# Patient Record
Sex: FEMALE | Race: WHITE | Employment: PART TIME | URBAN - METROPOLITAN AREA
[De-identification: names, ages, dates, MRNs, and addresses within clinical notes are randomized per-mention and may not be internally consistent; named-entity substitution may affect disease eponyms.]

---

## 2017-03-08 ENCOUNTER — TELEPHONE (OUTPATIENT)
Dept: ENDOCRINOLOGY | Age: 27
End: 2017-03-08

## 2017-03-09 ENCOUNTER — TELEPHONE (OUTPATIENT)
Dept: ENDOCRINOLOGY | Age: 27
End: 2017-03-09

## 2017-03-28 ENCOUNTER — OFFICE VISIT (OUTPATIENT)
Dept: ENDOCRINOLOGY | Age: 27
End: 2017-03-28

## 2017-03-28 VITALS
HEIGHT: 63 IN | BODY MASS INDEX: 26.01 KG/M2 | RESPIRATION RATE: 16 BRPM | WEIGHT: 146.8 LBS | DIASTOLIC BLOOD PRESSURE: 72 MMHG | HEART RATE: 82 BPM | SYSTOLIC BLOOD PRESSURE: 118 MMHG

## 2017-03-28 DIAGNOSIS — E13.9 LADA (LATENT AUTOIMMUNE DIABETES IN ADULTS), MANAGED AS TYPE 1 (HCC): Primary | ICD-10-CM

## 2017-03-28 LAB — HBA1C MFR BLD: 8.2 %

## 2017-03-28 PROCEDURE — 83036 HEMOGLOBIN GLYCOSYLATED A1C: CPT | Performed by: INTERNAL MEDICINE

## 2017-03-28 PROCEDURE — 99214 OFFICE O/P EST MOD 30 MIN: CPT | Performed by: INTERNAL MEDICINE

## 2017-04-26 RX ORDER — INSULIN GLARGINE 100 [IU]/ML
INJECTION, SOLUTION SUBCUTANEOUS
Qty: 15 ML | Refills: 3 | Status: SHIPPED | OUTPATIENT
Start: 2017-04-26 | End: 2017-10-23 | Stop reason: SDUPTHER

## 2017-07-10 ENCOUNTER — TELEPHONE (OUTPATIENT)
Dept: ENDOCRINOLOGY | Age: 27
End: 2017-07-10

## 2017-08-29 ENCOUNTER — TELEPHONE (OUTPATIENT)
Dept: ENDOCRINOLOGY | Age: 27
End: 2017-08-29

## 2017-09-06 ENCOUNTER — OFFICE VISIT (OUTPATIENT)
Dept: ENDOCRINOLOGY | Age: 27
End: 2017-09-06

## 2017-09-06 VITALS
DIASTOLIC BLOOD PRESSURE: 84 MMHG | HEIGHT: 63 IN | SYSTOLIC BLOOD PRESSURE: 118 MMHG | WEIGHT: 137.2 LBS | BODY MASS INDEX: 24.31 KG/M2 | HEART RATE: 83 BPM | OXYGEN SATURATION: 100 % | RESPIRATION RATE: 16 BRPM

## 2017-09-06 DIAGNOSIS — E13.9 LADA (LATENT AUTOIMMUNE DIABETES IN ADULTS), MANAGED AS TYPE 1 (HCC): Primary | ICD-10-CM

## 2017-09-06 LAB
CREATININE URINE: 39.4 MG/DL (ref 28–259)
HBA1C MFR BLD: 9 %
MICROALBUMIN UR-MCNC: <1.2 MG/DL
MICROALBUMIN/CREAT UR-RTO: NORMAL MG/G (ref 0–30)

## 2017-09-06 PROCEDURE — 83036 HEMOGLOBIN GLYCOSYLATED A1C: CPT | Performed by: INTERNAL MEDICINE

## 2017-09-06 PROCEDURE — 99214 OFFICE O/P EST MOD 30 MIN: CPT | Performed by: INTERNAL MEDICINE

## 2017-09-06 RX ORDER — BLOOD SUGAR DIAGNOSTIC
STRIP MISCELLANEOUS
Qty: 100 STRIP | Refills: 5 | Status: SHIPPED | OUTPATIENT
Start: 2017-09-06 | End: 2018-03-12 | Stop reason: SDUPTHER

## 2017-09-07 ENCOUNTER — TELEPHONE (OUTPATIENT)
Dept: ENDOCRINOLOGY | Age: 27
End: 2017-09-07

## 2017-10-09 ENCOUNTER — TELEPHONE (OUTPATIENT)
Dept: ENDOCRINOLOGY | Age: 27
End: 2017-10-09

## 2017-10-17 ENCOUNTER — TELEPHONE (OUTPATIENT)
Dept: ENDOCRINOLOGY | Age: 27
End: 2017-10-17

## 2017-10-17 NOTE — TELEPHONE ENCOUNTER
Patient called and said that she has been trying to get on Dexcom and they just contacted her and said that it needs a PA and that they have tried to contact us several times with no response. I have never received a phone call or fax request anything PA information. Advised that I was going to contact our Dexcom rep and see if he could help in any way.  Rashmi Rued a email and will await his response

## 2017-10-23 ENCOUNTER — TELEPHONE (OUTPATIENT)
Dept: ENDOCRINOLOGY | Age: 27
End: 2017-10-23

## 2017-10-23 NOTE — TELEPHONE ENCOUNTER
Please call Ragini Pfeiffer at [phone] from [facility]. Needs to know if fax was received for Prior Auth.

## 2017-10-24 RX ORDER — INSULIN GLARGINE 100 [IU]/ML
INJECTION, SOLUTION SUBCUTANEOUS
Qty: 15 ML | Refills: 3 | Status: SHIPPED | OUTPATIENT
Start: 2017-10-24 | End: 2018-04-19 | Stop reason: SDUPTHER

## 2017-11-29 ENCOUNTER — TELEPHONE (OUTPATIENT)
Dept: ENDOCRINOLOGY | Age: 27
End: 2017-11-29

## 2017-11-30 NOTE — TELEPHONE ENCOUNTER
Called [place] and voice mail sent me to another voice mail and I left a message for them to call me back

## 2017-12-12 ENCOUNTER — OFFICE VISIT (OUTPATIENT)
Dept: ENDOCRINOLOGY | Age: 27
End: 2017-12-12

## 2017-12-12 VITALS
HEART RATE: 92 BPM | SYSTOLIC BLOOD PRESSURE: 120 MMHG | BODY MASS INDEX: 24.06 KG/M2 | WEIGHT: 135.8 LBS | RESPIRATION RATE: 16 BRPM | DIASTOLIC BLOOD PRESSURE: 68 MMHG | HEIGHT: 63 IN

## 2017-12-12 DIAGNOSIS — E13.9 LADA (LATENT AUTOIMMUNE DIABETES IN ADULTS), MANAGED AS TYPE 1 (HCC): Primary | ICD-10-CM

## 2017-12-12 LAB — HBA1C MFR BLD: 8.6 %

## 2017-12-12 PROCEDURE — 83036 HEMOGLOBIN GLYCOSYLATED A1C: CPT | Performed by: INTERNAL MEDICINE

## 2017-12-12 PROCEDURE — 99214 OFFICE O/P EST MOD 30 MIN: CPT | Performed by: INTERNAL MEDICINE

## 2017-12-12 NOTE — PROGRESS NOTES
Seen as f/u patient for diabetes    Interim:   Sensor pending, insurance denied?     Previously Hospitalization for DKA, was not taking insulin, had severe DKA    Diagnosed with  diabetes mellitus 2014  IAA -ve  Insulin 3 Glucose 332  A1c 13.5  Weight loss , polyuria, dry mouth on presentation, no DKA    Known diabetic complications: none     Current diabetic medications    lantus 20/20   Novolog one unit for 2 gram of CHO  SSI 1 for 50>150  -2 <80   She takes about 30-40 gram CHO with meal-     Last A1c 8.6%<------9%<----8.2 on 3/17<----8.3 on 12/16<----------- 7.3 on 9/16<-----10.2 on 7/16<------ 11.4 on 4/16<-----10 on 1/16<----- 11.8 on 8/15<---10.8 <-- 8.0<--- 13.5    Prior visit with dietician: no  Current diet: on average, 3 meals per day  + 2 snacks  Tries to restrict to <60gm with meal   18gm with snack  Meal pattern irregular due to job  Current exercise: walking /soocer  Current monitoring regimen: home blood tests -1-2 times daily     Has brought blood glucose log/meter:no  Home blood sugar records:   Any episodes of hypoglycemia? occ in 60s, 57 lowest in the am    No Hx of CAD , PVD, CVA    Hyperlipidemia: LDL 92 on 4/15  Last eye exam: 9/16   Last foot exam: 12/17  Last microalbumin to creatinine ratio: 9/17    10/15  C-peptide 0.2 Glucose 95  SEDA 52 ICA -ve    FH: cousin has T1DM    Works as operation assistant for sports       Past Medical History:   Diagnosis Date    Diabetes mellitus type II 4/14    History of chickenpox 1997    HPV (human papilloma virus) infection 10/11    LGSIL (low grade squamous intraepithelial dysplasia) 10/11    Tinea versicolor      Past Surgical History:   Procedure Laterality Date    COLPOSCOPY  3/14    For [address] EXTRACTION         Review of Systems  scanned     Objective:        /68 (Site: Right Arm, Position: Sitting, Cuff Size: Medium Adult)   Pulse 92   Resp 16   Ht 5' 3\" (1.6 m)   Wt 135 lb 12.8 oz (61.6 kg)   BMI 24.06 kg/m² Wt Readings from Last 3 Encounters:   12/12/17 135 lb 12.8 oz (61.6 kg)   09/06/17 137 lb 3.2 oz (62.2 kg)   03/28/17 146 lb 12.8 oz (66.6 kg)     Constitutional: Well-developed, alert, appears stated age, cooperative, in no acute distress  H/E/N/M/T:atraumatic, normocephalic, external ears, nose, lips normal without lesions  Eyes: Arcus Senilis is not present, extraocular muscles are intact  Neck: supple, trachea midline, acanthosis nigricance is not present. Skeletal muscular: no kyphosis, no gross abnormalities  Skin: Xanthoma/Xanthelasmas are  not present  Psychiatric: Judgement and Insight:  judgement and insight appear normal  Neuro: Normal without focal findings, speech is spontaneous    12/17  Skeletal foot exam is normal, no skin lesions, toenails are normal,  10 g monofilament is 10/10 on the right and 10/10 on the left   Lab Reviewed   No components found for: CHLPL  Lab Results   Component Value Date    TRIG 48 04/11/2015    TRIG 110 04/30/2014     Lab Results   Component Value Date    HDL 45 04/11/2015    HDL 43 04/30/2014     Lab Results   Component Value Date    LDLCALC 92 04/11/2015    LDLCALC 110 (H) 04/30/2014     Lab Results   Component Value Date    LABVLDL 10 04/11/2015    LABVLDL 22 04/30/2014     Lab Results   Component Value Date    LABA1C 9.0 09/06/2017       Assessment:     Otis Santiago is a 28 y.o. female with : 1.DM: Onset, presentation, insulin level, +FH and blood glucose trends suggestive of OMAR, treat as T1DM. Disease process and goals discussed. Refered for CHO counting, started on prandial insulin. Pump options discussed, patient will think about it. Sensoe not covered, may try Freestyle lite. . Also discussed V-go, but bolus dose is high, discussed self monitoring. A1c still high , needs to bring log, reports high during the day, will adjust dose.    2.Weight gain:   discussed weight loss, recommend 1500-1800Kcal     Plan:       Change lantus 22/20   Change  novolog one unit for 2 gram of CHO  With breakfast and supper   1:1.5 with lunch   SSI 2 for 50>150  -6 <80   Take with snack if > 5gm CHO   Advised 670 G, she will think about it   Take before or after meals as her meal intake is not regular given her job   Advise to check blood sugar 4 times a day   Patient to send blood sugar log for titration. Advised to low simple carbohydrate and protein with each  meal diet. 45-60gm with meal  10-15gm with snack   Diabetes Care: recommend yearly eye exam, foot exam and urine microalbumin to   creatinine ratio.      -LDL at goal  -Daily ASA: not indicated  -Smoking status: non smoker  Advise not to hold basal insulin    F/u in 6 weeks with Tedrae Dayhoff   F/u in 12 weeks with me

## 2017-12-28 ENCOUNTER — TELEPHONE (OUTPATIENT)
Dept: ENDOCRINOLOGY | Age: 27
End: 2017-12-28

## 2018-01-26 ENCOUNTER — OFFICE VISIT (OUTPATIENT)
Dept: ENDOCRINOLOGY | Age: 28
End: 2018-01-26

## 2018-01-26 VITALS
SYSTOLIC BLOOD PRESSURE: 115 MMHG | OXYGEN SATURATION: 98 % | HEIGHT: 63 IN | HEART RATE: 80 BPM | WEIGHT: 139.4 LBS | DIASTOLIC BLOOD PRESSURE: 77 MMHG | BODY MASS INDEX: 24.7 KG/M2

## 2018-01-26 DIAGNOSIS — E13.9 LADA (LATENT AUTOIMMUNE DIABETES IN ADULTS), MANAGED AS TYPE 1 (HCC): Primary | ICD-10-CM

## 2018-01-26 DIAGNOSIS — R53.82 CHRONIC FATIGUE: ICD-10-CM

## 2018-01-26 PROCEDURE — 99213 OFFICE O/P EST LOW 20 MIN: CPT | Performed by: NURSE PRACTITIONER

## 2018-01-26 RX ORDER — PEN NEEDLE, DIABETIC 30 GX3/16"
NEEDLE, DISPOSABLE MISCELLANEOUS
COMMUNITY
End: 2018-03-12 | Stop reason: SDUPTHER

## 2018-01-26 ASSESSMENT — PATIENT HEALTH QUESTIONNAIRE - PHQ9
1. LITTLE INTEREST OR PLEASURE IN DOING THINGS: 0
2. FEELING DOWN, DEPRESSED OR HOPELESS: 0
SUM OF ALL RESPONSES TO PHQ9 QUESTIONS 1 & 2: 0
SUM OF ALL RESPONSES TO PHQ QUESTIONS 1-9: 0

## 2018-01-26 NOTE — PROGRESS NOTES
sleep disturbance. The patient is not nervous/anxious. Vitals:    01/26/18 1145   BP: 115/77   Site: Left Arm   Position: Sitting   Cuff Size: Medium Adult   Pulse: 80   SpO2: 98%   Weight: 139 lb 6.4 oz (63.2 kg)   Height: 5' 3\" (1.6 m)     Physical Exam   Constitutional: She is oriented to person, place, and time. She appears well-developed and well-nourished. Eyes: Pupils are equal, round, and reactive to light. Neck: Normal range of motion. No thyromegaly present. Cardiovascular: Normal rate, regular rhythm and normal heart sounds. Pulmonary/Chest: Effort normal and breath sounds normal.   Abdominal: She exhibits no distension. Musculoskeletal: Normal range of motion. She exhibits no edema. Neurological: She is alert and oriented to person, place, and time. No sensory deficit. Skin: Skin is warm and dry. No skin changes or evidence of trauma. Psychiatric: She has a normal mood and affect. Her behavior is normal. Thought content normal.   Vitals reviewed. Skeletal foot exam: No skin lesions are noted. Skin is normal. Signs of onychomycosis are not noted on the skin. Calluses are not noted. Ingrown toenails are not noted. Toenails are normal. Pulses are +2 DP and +2 PT bilaterally. Capillary refill is <3 seconds. Skeletal structures are intact upon visual examination. No deformity is noted. Sensory: 10 g monofilament is 10/10 on the right and 10/10 on the left, 128 Hz vibration sense is present bilaterally.     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 Decom sensor was denied. Other Visit Diagnoses    None. Return in about 3 months (around 4/26/2018).

## 2018-01-29 ASSESSMENT — ENCOUNTER SYMPTOMS
CONSTIPATION: 0
DIARRHEA: 0
COLOR CHANGE: 0
SHORTNESS OF BREATH: 0
NAUSEA: 0
EYE PAIN: 0

## 2018-03-06 RX ORDER — INSULIN ASPART 100 [IU]/ML
INJECTION, SOLUTION INTRAVENOUS; SUBCUTANEOUS
Qty: 15 ML | Refills: 3 | Status: SHIPPED | OUTPATIENT
Start: 2018-03-06 | End: 2019-01-28

## 2018-03-12 ENCOUNTER — OFFICE VISIT (OUTPATIENT)
Dept: ENDOCRINOLOGY | Age: 28
End: 2018-03-12

## 2018-03-12 VITALS
RESPIRATION RATE: 16 BRPM | DIASTOLIC BLOOD PRESSURE: 68 MMHG | HEIGHT: 63 IN | BODY MASS INDEX: 24.7 KG/M2 | WEIGHT: 139.4 LBS | HEART RATE: 90 BPM | SYSTOLIC BLOOD PRESSURE: 118 MMHG

## 2018-03-12 DIAGNOSIS — E13.9 LADA (LATENT AUTOIMMUNE DIABETES IN ADULTS), MANAGED AS TYPE 1 (HCC): Primary | ICD-10-CM

## 2018-03-12 LAB — HBA1C MFR BLD: 7.6 %

## 2018-03-12 PROCEDURE — 83036 HEMOGLOBIN GLYCOSYLATED A1C: CPT | Performed by: INTERNAL MEDICINE

## 2018-03-12 PROCEDURE — 99214 OFFICE O/P EST MOD 30 MIN: CPT | Performed by: INTERNAL MEDICINE

## 2018-03-12 RX ORDER — PEN NEEDLE, DIABETIC 30 GX3/16"
NEEDLE, DISPOSABLE MISCELLANEOUS
Qty: 450 EACH | Refills: 1 | Status: SHIPPED | OUTPATIENT
Start: 2018-03-12

## 2018-03-12 RX ORDER — FLASH GLUCOSE SENSOR
KIT MISCELLANEOUS
Qty: 3 EACH | Refills: 2 | Status: SHIPPED | OUTPATIENT
Start: 2018-03-12 | End: 2019-01-09

## 2018-03-12 RX ORDER — FLASH GLUCOSE SENSOR
KIT MISCELLANEOUS
Qty: 1 DEVICE | Refills: 0 | Status: SHIPPED | OUTPATIENT
Start: 2018-03-12 | End: 2019-01-09

## 2018-03-12 NOTE — PROGRESS NOTES
Wt 139 lb 6.4 oz (63.2 kg)   BMI 24.69 kg/m²   Wt Readings from Last 3 Encounters:   03/12/18 139 lb 6.4 oz (63.2 kg)   01/26/18 139 lb 6.4 oz (63.2 kg)   12/12/17 135 lb 12.8 oz (61.6 kg)     Constitutional: Well-developed, alert, appears stated age, cooperative, in no acute distress  H/E/N/M/T:atraumatic, normocephalic, external ears, nose, lips normal without lesions  Eyes: Arcus Senilis is not present, extraocular muscles are intact  Neck: supple, trachea midline, acanthosis nigricance is not present. Skeletal muscular: no kyphosis, no gross abnormalities  Skin: Xanthoma/Xanthelasmas are  not present  Psychiatric: Judgement and Insight:  judgement and insight appear normal  Neuro: Normal without focal findings, speech is spontaneous    12/17  Skeletal foot exam is normal, no skin lesions, toenails are normal,  10 g monofilament is 10/10 on the right and 10/10 on the left   Lab Reviewed   No components found for: CHLPL  Lab Results   Component Value Date    TRIG 48 04/11/2015    TRIG 110 04/30/2014     Lab Results   Component Value Date    HDL 45 04/11/2015    HDL 43 04/30/2014     Lab Results   Component Value Date    LDLCALC 92 04/11/2015    LDLCALC 110 (H) 04/30/2014     Lab Results   Component Value Date    LABVLDL 10 04/11/2015    LABVLDL 22 04/30/2014     Lab Results   Component Value Date    LABA1C 8.6 12/12/2017       Assessment:     Diana Lam is a 33 y.o. female with : 1.DM: Onset, presentation, insulin level, +FH and blood glucose trends suggestive of OMAR, treat as T1DM. Disease process and goals discussed. Refered for CHO counting, started on prandial insulin. Pump options discussed, patient will think about it. Sensor not covered,  try Freestyle lite. . Also discussed V-go, but bolus dose is high, discussed self monitoring. A1c improved, advise bolus with breakfast also as not giving given concerns of lows. Will change I:C with breakfast  2. Weight gain:   discussed weight loss, recommend

## 2018-04-19 RX ORDER — INSULIN GLARGINE 100 [IU]/ML
INJECTION, SOLUTION SUBCUTANEOUS
Qty: 15 ML | Refills: 0 | Status: SHIPPED | OUTPATIENT
Start: 2018-04-19 | End: 2018-06-03 | Stop reason: SDUPTHER

## 2018-06-04 RX ORDER — INSULIN GLARGINE 100 [IU]/ML
INJECTION, SOLUTION SUBCUTANEOUS
Qty: 15 ML | Refills: 3 | Status: SHIPPED | OUTPATIENT
Start: 2018-06-04 | End: 2018-11-24 | Stop reason: SDUPTHER

## 2018-06-19 ENCOUNTER — OFFICE VISIT (OUTPATIENT)
Dept: ENDOCRINOLOGY | Age: 28
End: 2018-06-19

## 2018-06-19 VITALS
HEART RATE: 79 BPM | BODY MASS INDEX: 23.53 KG/M2 | SYSTOLIC BLOOD PRESSURE: 128 MMHG | RESPIRATION RATE: 16 BRPM | DIASTOLIC BLOOD PRESSURE: 86 MMHG | OXYGEN SATURATION: 99 % | WEIGHT: 132.8 LBS | HEIGHT: 63 IN

## 2018-06-19 DIAGNOSIS — E13.9 LADA (LATENT AUTOIMMUNE DIABETES IN ADULTS), MANAGED AS TYPE 1 (HCC): Primary | ICD-10-CM

## 2018-06-19 LAB — HBA1C MFR BLD: 9.3 %

## 2018-06-19 PROCEDURE — 83036 HEMOGLOBIN GLYCOSYLATED A1C: CPT | Performed by: INTERNAL MEDICINE

## 2018-06-19 PROCEDURE — 99214 OFFICE O/P EST MOD 30 MIN: CPT | Performed by: INTERNAL MEDICINE

## 2018-11-26 RX ORDER — INSULIN GLARGINE 100 [IU]/ML
INJECTION, SOLUTION SUBCUTANEOUS
Qty: 15 ML | Refills: 0 | Status: SHIPPED | OUTPATIENT
Start: 2018-11-26 | End: 2019-01-04 | Stop reason: SDUPTHER

## 2019-01-09 ENCOUNTER — OFFICE VISIT (OUTPATIENT)
Dept: ENDOCRINOLOGY | Age: 29
End: 2019-01-09
Payer: COMMERCIAL

## 2019-01-09 VITALS
DIASTOLIC BLOOD PRESSURE: 81 MMHG | OXYGEN SATURATION: 98 % | BODY MASS INDEX: 24.73 KG/M2 | HEART RATE: 74 BPM | RESPIRATION RATE: 16 BRPM | SYSTOLIC BLOOD PRESSURE: 128 MMHG | WEIGHT: 139.6 LBS | HEIGHT: 63 IN

## 2019-01-09 DIAGNOSIS — R63.5 WEIGHT GAIN: ICD-10-CM

## 2019-01-09 DIAGNOSIS — E13.9 LADA (LATENT AUTOIMMUNE DIABETES IN ADULTS), MANAGED AS TYPE 1 (HCC): Primary | ICD-10-CM

## 2019-01-09 LAB — HBA1C MFR BLD: 10.1 %

## 2019-01-09 PROCEDURE — 99214 OFFICE O/P EST MOD 30 MIN: CPT | Performed by: INTERNAL MEDICINE

## 2019-01-09 PROCEDURE — 83036 HEMOGLOBIN GLYCOSYLATED A1C: CPT | Performed by: INTERNAL MEDICINE

## 2019-01-09 RX ORDER — FLASH GLUCOSE SCANNING READER
EACH MISCELLANEOUS
Qty: 1 DEVICE | Refills: 2 | Status: SHIPPED | OUTPATIENT
Start: 2019-01-09

## 2019-01-09 RX ORDER — FLASH GLUCOSE SENSOR
KIT MISCELLANEOUS
Qty: 2 EACH | Refills: 2 | Status: SHIPPED | OUTPATIENT
Start: 2019-01-09 | End: 2019-02-21 | Stop reason: SDUPTHER

## 2019-01-29 LAB
CREATININE URINE: 69.8 MG/DL (ref 28–259)
MICROALBUMIN UR-MCNC: <1.2 MG/DL
MICROALBUMIN/CREAT UR-RTO: NORMAL MG/G (ref 0–30)

## 2019-01-30 ENCOUNTER — TELEPHONE (OUTPATIENT)
Dept: ENDOCRINOLOGY | Age: 29
End: 2019-01-30

## 2019-02-06 ENCOUNTER — TELEPHONE (OUTPATIENT)
Dept: ENDOCRINOLOGY | Age: 29
End: 2019-02-06

## 2019-02-20 PROBLEM — S52.601A RIGHT DISTAL ULNAR FRACTURE: Status: ACTIVE | Noted: 2019-02-01

## 2019-02-21 ENCOUNTER — OFFICE VISIT (OUTPATIENT)
Dept: ENDOCRINOLOGY | Age: 29
End: 2019-02-21
Payer: COMMERCIAL

## 2019-02-21 VITALS
OXYGEN SATURATION: 96 % | DIASTOLIC BLOOD PRESSURE: 79 MMHG | HEIGHT: 63 IN | WEIGHT: 145 LBS | BODY MASS INDEX: 25.69 KG/M2 | SYSTOLIC BLOOD PRESSURE: 112 MMHG | HEART RATE: 89 BPM

## 2019-02-21 DIAGNOSIS — R53.82 CHRONIC FATIGUE: ICD-10-CM

## 2019-02-21 DIAGNOSIS — E13.9 LADA (LATENT AUTOIMMUNE DIABETES IN ADULTS), MANAGED AS TYPE 1 (HCC): Primary | ICD-10-CM

## 2019-02-21 PROCEDURE — 99213 OFFICE O/P EST LOW 20 MIN: CPT | Performed by: NURSE PRACTITIONER

## 2019-02-21 RX ORDER — FLASH GLUCOSE SENSOR
KIT MISCELLANEOUS
Qty: 2 EACH | Refills: 5 | Status: SHIPPED | OUTPATIENT
Start: 2019-02-21 | End: 2019-07-23 | Stop reason: SDUPTHER

## 2019-02-21 ASSESSMENT — ENCOUNTER SYMPTOMS
COLOR CHANGE: 0
CONSTIPATION: 0
NAUSEA: 0
SHORTNESS OF BREATH: 0
EYE PAIN: 0
DIARRHEA: 0

## 2019-03-13 ENCOUNTER — OFFICE VISIT (OUTPATIENT)
Dept: ENDOCRINOLOGY | Age: 29
End: 2019-03-13
Payer: COMMERCIAL

## 2019-03-13 VITALS
TEMPERATURE: 98.1 F | OXYGEN SATURATION: 99 % | WEIGHT: 147 LBS | SYSTOLIC BLOOD PRESSURE: 109 MMHG | BODY MASS INDEX: 26.05 KG/M2 | RESPIRATION RATE: 16 BRPM | HEART RATE: 80 BPM | DIASTOLIC BLOOD PRESSURE: 71 MMHG | HEIGHT: 63 IN

## 2019-03-13 DIAGNOSIS — E11.649 TYPE 2 DIABETES MELLITUS WITH HYPOGLYCEMIA WITHOUT COMA, WITHOUT LONG-TERM CURRENT USE OF INSULIN (HCC): Primary | ICD-10-CM

## 2019-03-13 DIAGNOSIS — E16.2 HYPOGLYCEMIA: ICD-10-CM

## 2019-03-13 DIAGNOSIS — E10.649 UNCONTROLLED TYPE 1 DIABETES MELLITUS WITH HYPOGLYCEMIA WITHOUT COMA (HCC): ICD-10-CM

## 2019-03-13 LAB — HBA1C MFR BLD: 7.4 %

## 2019-03-13 PROCEDURE — 99214 OFFICE O/P EST MOD 30 MIN: CPT | Performed by: INTERNAL MEDICINE

## 2019-03-13 PROCEDURE — 83036 HEMOGLOBIN GLYCOSYLATED A1C: CPT | Performed by: INTERNAL MEDICINE

## 2019-06-04 NOTE — TELEPHONE ENCOUNTER
LOV: 3/13/19    NOV: 6/19/19    DOSE:     Frequency:     Pharmacy as Pended:     Per LOV Note:     Per Last PHONE NOTE:     Lab Results   Component Value Date    LABA1C 7.4 03/13/2019

## 2019-06-19 ENCOUNTER — OFFICE VISIT (OUTPATIENT)
Dept: ENDOCRINOLOGY | Age: 29
End: 2019-06-19
Payer: COMMERCIAL

## 2019-06-19 VITALS
DIASTOLIC BLOOD PRESSURE: 75 MMHG | OXYGEN SATURATION: 98 % | HEIGHT: 63 IN | HEART RATE: 69 BPM | WEIGHT: 144.6 LBS | SYSTOLIC BLOOD PRESSURE: 112 MMHG | BODY MASS INDEX: 25.62 KG/M2

## 2019-06-19 DIAGNOSIS — E11.649 TYPE 2 DIABETES MELLITUS WITH HYPOGLYCEMIA WITHOUT COMA, WITHOUT LONG-TERM CURRENT USE OF INSULIN (HCC): ICD-10-CM

## 2019-06-19 DIAGNOSIS — E16.2 HYPOGLYCEMIA: ICD-10-CM

## 2019-06-19 DIAGNOSIS — E10.65 UNCONTROLLED TYPE 1 DIABETES MELLITUS WITH HYPERGLYCEMIA (HCC): Primary | ICD-10-CM

## 2019-06-19 LAB — HBA1C MFR BLD: 7.1 %

## 2019-06-19 PROCEDURE — 99213 OFFICE O/P EST LOW 20 MIN: CPT | Performed by: INTERNAL MEDICINE

## 2019-06-19 PROCEDURE — 95251 CONT GLUC MNTR ANALYSIS I&R: CPT | Performed by: INTERNAL MEDICINE

## 2019-06-19 PROCEDURE — 83036 HEMOGLOBIN GLYCOSYLATED A1C: CPT | Performed by: INTERNAL MEDICINE

## 2019-06-19 RX ORDER — BLOOD-GLUCOSE SENSOR
EACH MISCELLANEOUS
Qty: 4 EACH | Refills: 0 | Status: SHIPPED | OUTPATIENT
Start: 2019-06-19 | End: 2019-06-19 | Stop reason: SDUPTHER

## 2019-06-19 RX ORDER — BLOOD-GLUCOSE TRANSMITTER
EACH MISCELLANEOUS
Qty: 1 EACH | Refills: 0 | Status: SHIPPED | OUTPATIENT
Start: 2019-06-19 | End: 2019-06-19 | Stop reason: SDUPTHER

## 2019-06-19 RX ORDER — BLOOD-GLUCOSE,RECEIVER,CONT
EACH MISCELLANEOUS
Qty: 1 DEVICE | Refills: 0 | Status: SHIPPED | OUTPATIENT
Start: 2019-06-19 | End: 2019-06-19 | Stop reason: SDUPTHER
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
## 2019-06-20 RX ORDER — BLOOD-GLUCOSE,RECEIVER,CONT
EACH MISCELLANEOUS
Qty: 1 DEVICE | Refills: 0 | Status: SHIPPED | OUTPATIENT
Start: 2019-06-20 | End: 2019-08-06 | Stop reason: ALTCHOICE

## 2019-06-20 RX ORDER — BLOOD-GLUCOSE TRANSMITTER
EACH MISCELLANEOUS
Qty: 1 EACH | Refills: 0 | Status: SHIPPED | OUTPATIENT
Start: 2019-06-20 | End: 2019-08-06 | Stop reason: ALTCHOICE

## 2019-06-20 RX ORDER — BLOOD-GLUCOSE SENSOR
EACH MISCELLANEOUS
Qty: 4 EACH | Refills: 0 | Status: SHIPPED | OUTPATIENT
Start: 2019-06-20 | End: 2019-08-06 | Stop reason: ALTCHOICE

## 2019-07-23 DIAGNOSIS — E13.9 LADA (LATENT AUTOIMMUNE DIABETES IN ADULTS), MANAGED AS TYPE 1 (HCC): ICD-10-CM

## 2019-07-23 RX ORDER — FLASH GLUCOSE SENSOR
KIT MISCELLANEOUS
Qty: 2 EACH | Refills: 5 | Status: SHIPPED | OUTPATIENT
Start: 2019-07-23 | End: 2020-01-27

## 2019-08-05 ENCOUNTER — TELEPHONE (OUTPATIENT)
Dept: ENDOCRINOLOGY | Age: 29
End: 2019-08-05

## 2019-08-05 NOTE — TELEPHONE ENCOUNTER
JOSE. Patient was told to inform Dr. Albert Camacho that she was having surgery next week, she is getting a plate in her right forearm.

## 2019-08-06 ENCOUNTER — OFFICE VISIT (OUTPATIENT)
Dept: FAMILY MEDICINE CLINIC | Age: 29
End: 2019-08-06
Payer: COMMERCIAL

## 2019-08-06 VITALS
DIASTOLIC BLOOD PRESSURE: 78 MMHG | TEMPERATURE: 98.1 F | RESPIRATION RATE: 19 BRPM | WEIGHT: 145.4 LBS | OXYGEN SATURATION: 97 % | BODY MASS INDEX: 25.76 KG/M2 | HEART RATE: 78 BPM | SYSTOLIC BLOOD PRESSURE: 123 MMHG

## 2019-08-06 DIAGNOSIS — S52.234A CLOSED NONDISPLACED OBLIQUE FRACTURE OF SHAFT OF RIGHT ULNA, INITIAL ENCOUNTER: ICD-10-CM

## 2019-08-06 DIAGNOSIS — E13.9 LADA (LATENT AUTOIMMUNE DIABETES IN ADULTS), MANAGED AS TYPE 1 (HCC): ICD-10-CM

## 2019-08-06 DIAGNOSIS — Z01.818 PREOP EXAMINATION: Primary | ICD-10-CM

## 2019-08-06 PROCEDURE — 93000 ELECTROCARDIOGRAM COMPLETE: CPT | Performed by: FAMILY MEDICINE

## 2019-08-06 PROCEDURE — 99243 OFF/OP CNSLTJ NEW/EST LOW 30: CPT | Performed by: FAMILY MEDICINE
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
## 2019-08-10 LAB — HCG(URINE) PREGNANCY TEST: NEGATIVE

## 2019-10-16 ENCOUNTER — OFFICE VISIT (OUTPATIENT)
Dept: ENDOCRINOLOGY | Age: 29
End: 2019-10-16
Payer: COMMERCIAL

## 2019-10-16 VITALS
WEIGHT: 146.4 LBS | HEIGHT: 63 IN | DIASTOLIC BLOOD PRESSURE: 78 MMHG | BODY MASS INDEX: 25.94 KG/M2 | SYSTOLIC BLOOD PRESSURE: 117 MMHG | RESPIRATION RATE: 16 BRPM | HEART RATE: 85 BPM | OXYGEN SATURATION: 95 %

## 2019-10-16 DIAGNOSIS — E16.2 HYPOGLYCEMIA: ICD-10-CM

## 2019-10-16 DIAGNOSIS — E10.65 UNCONTROLLED TYPE 1 DIABETES MELLITUS WITH HYPERGLYCEMIA (HCC): Primary | ICD-10-CM

## 2019-10-16 LAB — HBA1C MFR BLD: 6.5 %

## 2019-10-16 PROCEDURE — 83036 HEMOGLOBIN GLYCOSYLATED A1C: CPT | Performed by: INTERNAL MEDICINE

## 2019-10-16 PROCEDURE — 99214 OFFICE O/P EST MOD 30 MIN: CPT | Performed by: INTERNAL MEDICINE

## 2020-01-27 RX ORDER — FLASH GLUCOSE SENSOR
KIT MISCELLANEOUS
Qty: 2 EACH | Refills: 5 | Status: SHIPPED | OUTPATIENT
Start: 2020-01-27 | End: 2020-06-22

## 2020-01-29 ENCOUNTER — OFFICE VISIT (OUTPATIENT)
Dept: ENDOCRINOLOGY | Age: 30
End: 2020-01-29
Payer: COMMERCIAL

## 2020-01-29 VITALS
WEIGHT: 145 LBS | DIASTOLIC BLOOD PRESSURE: 75 MMHG | OXYGEN SATURATION: 99 % | BODY MASS INDEX: 25.69 KG/M2 | HEART RATE: 69 BPM | HEIGHT: 63 IN | SYSTOLIC BLOOD PRESSURE: 113 MMHG

## 2020-01-29 LAB — HBA1C MFR BLD: 7.2 %

## 2020-01-29 PROCEDURE — 99213 OFFICE O/P EST LOW 20 MIN: CPT | Performed by: INTERNAL MEDICINE

## 2020-01-29 PROCEDURE — 83036 HEMOGLOBIN GLYCOSYLATED A1C: CPT | Performed by: INTERNAL MEDICINE

## 2020-01-29 NOTE — PROGRESS NOTES
Surgical History:   Procedure Laterality Date    COLPOSCOPY  3/14    For [address] EXTRACTION         Review of Systems  Scanned. reviewed     Objective:        /75 (Site: Left Upper Arm, Position: Sitting, Cuff Size: Medium Adult)   Pulse 69   Ht 5' 3\" (1.6 m)   Wt 145 lb (65.8 kg)   LMP  (LMP Unknown)   SpO2 99%   Breastfeeding No   BMI 25.69 kg/m²   Wt Readings from Last 3 Encounters:   01/29/20 145 lb (65.8 kg)   10/16/19 146 lb 6.4 oz (66.4 kg)   08/06/19 145 lb 6.4 oz (66 kg)     Constitutional: Well-developed, appears stated age, cooperative, in no acute distress  H/E/N/M/T:atraumatic, normocephalic, external ears, nose, lips normal without lesions  Eyes: Lids, lashes, conjunctivae and sclerae normal, No proptosis, no redness  Neck: supple, symmetrical, no swelling  Skin: No obvious rashes or lesions present. Skin and hair texture normal  Psychiatric: Judgement and Insight:  judgement and insight appear normal  Neuro: Normal without focal findings, speech is normal normal, speech is spontaneous  Chest: No labored breathing, no chest deformity, no stridor  Musculoskeletal: No joint deformity, swelling    1/19    Skeletal foot exam is normal, no skin lesions, toenails are normal,  10 g monofilament is 10/10 on the right and 10/10 on the left     Lab Reviewed   No components found for: CHLPL  Lab Results   Component Value Date    TRIG 48 04/11/2015    TRIG 110 04/30/2014     Lab Results   Component Value Date    HDL 45 04/11/2015    HDL 43 04/30/2014     Lab Results   Component Value Date    LDLCALC 92 04/11/2015    LDLCALC 110 (H) 04/30/2014     Lab Results   Component Value Date    LABVLDL 10 04/11/2015    LABVLDL 22 04/30/2014     Lab Results   Component Value Date    LABA1C 6.5 10/16/2019       Assessment:     Ashwin Grimaldo is a 26 y.o. female with : 1.DM: Onset, presentation, insulin level, +FH and blood glucose trends suggestive of OMAR, treat as T1DM.  Disease process

## 2020-02-13 DIAGNOSIS — E10.65 UNCONTROLLED TYPE 1 DIABETES MELLITUS WITH HYPERGLYCEMIA (HCC): ICD-10-CM

## 2020-02-13 LAB
CREATININE URINE: 100.9 MG/DL (ref 28–259)
MICROALBUMIN UR-MCNC: <1.2 MG/DL
MICROALBUMIN/CREAT UR-RTO: NORMAL MG/G (ref 0–30)

## 2020-02-18 RX ORDER — INSULIN LISPRO 100 [IU]/ML
INJECTION, SOLUTION INTRAVENOUS; SUBCUTANEOUS
Qty: 15 ML | Refills: 3 | Status: SHIPPED | OUTPATIENT
Start: 2020-02-18 | End: 2021-04-06

## 2020-04-29 ENCOUNTER — VIRTUAL VISIT (OUTPATIENT)
Dept: ENDOCRINOLOGY | Age: 30
End: 2020-04-29
Payer: COMMERCIAL

## 2020-04-29 PROCEDURE — 95251 CONT GLUC MNTR ANALYSIS I&R: CPT | Performed by: INTERNAL MEDICINE

## 2020-04-29 PROCEDURE — 99214 OFFICE O/P EST MOD 30 MIN: CPT | Performed by: INTERNAL MEDICINE

## 2020-04-29 PROCEDURE — 3051F HG A1C>EQUAL 7.0%<8.0%: CPT | Performed by: INTERNAL MEDICINE

## 2020-06-22 RX ORDER — FLASH GLUCOSE SENSOR
KIT MISCELLANEOUS
Qty: 2 EACH | Refills: 5 | Status: SHIPPED | OUTPATIENT
Start: 2020-06-22 | End: 2020-10-29 | Stop reason: SDUPTHER

## 2020-08-17 RX ORDER — INSULIN GLARGINE 100 [IU]/ML
INJECTION, SOLUTION SUBCUTANEOUS
Qty: 15 ML | Refills: 3 | Status: SHIPPED | OUTPATIENT
Start: 2020-08-17 | End: 2021-04-06

## 2020-10-29 ENCOUNTER — PATIENT MESSAGE (OUTPATIENT)
Dept: ENDOCRINOLOGY | Age: 30
End: 2020-10-29

## 2020-10-29 RX ORDER — FLASH GLUCOSE SENSOR
KIT MISCELLANEOUS
Qty: 2 EACH | Refills: 5 | Status: SHIPPED | OUTPATIENT
Start: 2020-10-29 | End: 2021-04-05

## 2020-10-29 NOTE — TELEPHONE ENCOUNTER
Medication:   Requested Prescriptions     Pending Prescriptions Disp Refills    Continuous Blood Gluc Sensor (FREESTYLE SHERRY 14 DAY SENSOR) MISC 2 each 5     Sig: USE AS NEEDED EVERY 14 DAYS       Last Filled:      Patient Phone Number: [phone] (home) [phone] (work)    Last appt: 4/29/2020   Next appt: Visit date not found    Last Labs DM:   Lab Results   Component Value Date    LABA1C 7.2 01/29/2020

## 2020-10-29 NOTE — TELEPHONE ENCOUNTER
From: Uri Kwan  To: Jude Ariza MD  Sent: 10/29/2020 8:44 AM EDT  Subject: Prescription Question    Can I get a new prescription for the free style approved? My last one is done and they wont fill a new one yet.

## 2020-11-17 ENCOUNTER — TELEPHONE (OUTPATIENT)
Dept: FAMILY MEDICINE CLINIC | Age: 30
End: 2020-11-17

## 2021-01-13 NOTE — TELEPHONE ENCOUNTER
Medication:   Requested Prescriptions     Pending Prescriptions Disp Refills    blood glucose test strips (ACCU-CHEK ABIGAIL PLUS) strip [Pharmacy Med Name: [address] 100S] 400 strip 0     Sig: TEST BEFORE MEALS AND AT BEDTIME       Last Filled:      Patient Phone Number: [phone] (home) [phone] (work)    Last appt: 6/19/2018   Next appt: 1/13/2021    Last Labs DM:   Lab Results   Component Value Date    LABA1C 7.2 01/29/2020

## 2021-01-13 NOTE — TELEPHONE ENCOUNTER
Medication:   Requested Prescriptions     Pending Prescriptions Disp Refills    blood glucose test strips (ACCU-CHEK ABIGAIL PLUS) strip [Pharmacy Med Name: [address] [facility] 100S] 400 strip 0     Sig: TEST BEFORE MEALS AND AT BEDTIME       Last Filled:      Patient Phone Number: [phone] (home) [phone] (work)    Last appt: 6/19/2018   Next appt: Visit date not found    Last Labs DM:   Lab Results   Component Value Date    LABA1C 7.2 01/29/2020

## 2021-01-14 RX ORDER — BLOOD SUGAR DIAGNOSTIC
STRIP MISCELLANEOUS
Qty: 400 STRIP | Refills: 0 | Status: SHIPPED | OUTPATIENT
Start: 2021-01-14 | End: 2022-03-15

## 2021-01-14 RX ORDER — BLOOD SUGAR DIAGNOSTIC
STRIP MISCELLANEOUS
Qty: 400 STRIP | Refills: 0 | Status: SHIPPED | OUTPATIENT
Start: 2021-01-14

## 2021-04-04 DIAGNOSIS — E13.9 LADA (LATENT AUTOIMMUNE DIABETES IN ADULTS), MANAGED AS TYPE 1 (HCC): ICD-10-CM

## 2021-04-05 RX ORDER — FLASH GLUCOSE SENSOR
KIT MISCELLANEOUS
Qty: 2 EACH | Refills: 5 | Status: SHIPPED | OUTPATIENT
Start: 2021-04-05 | End: 2021-08-18 | Stop reason: SDUPTHER

## 2021-04-06 RX ORDER — INSULIN GLARGINE 100 [IU]/ML
INJECTION, SOLUTION SUBCUTANEOUS
Qty: 15 ML | Refills: 3 | Status: SHIPPED | OUTPATIENT
Start: 2021-04-06 | End: 2021-08-18 | Stop reason: SDUPTHER

## 2021-04-06 RX ORDER — INSULIN LISPRO 100 [IU]/ML
INJECTION, SOLUTION INTRAVENOUS; SUBCUTANEOUS
Qty: 15 ML | Refills: 3 | Status: SHIPPED | OUTPATIENT
Start: 2021-04-06 | End: 2021-08-18 | Stop reason: SDUPTHER

## 2021-04-06 NOTE — TELEPHONE ENCOUNTER
Medication:   Requested Prescriptions     Pending Prescriptions Disp Refills    LANTUS SOLOSTAR 100 UNIT/ML injection pen [Pharmacy Med Name: LANTUS SOLOSTAR PEN INJ 3ML] 15 mL 3     Sig: INJECT 20 UNITS UNDER THE SKIN IN THE MORNING AND 13 UNITS AT NIGHT.     insulin lispro, 1 Unit Dial, 100 UNIT/ML SOPN [Pharmacy Med Name: INSULIN LISPRO 100U/ML KWIKPEN 3ML] 15 mL 3     Sig: ADMINISTER 11 UNITS UNDER SKIN THREE TIMES DAILY BEFORE A MEAL       Last Filled:      Patient Phone Number: [phone] (home) [phone] (work)    Last appt: 4/29/2020   Next appt: Visit date not found    Last Labs DM:   Lab Results   Component Value Date    LABA1C 7.2 01/29/2020

## 2021-08-18 ENCOUNTER — OFFICE VISIT (OUTPATIENT)
Dept: ENDOCRINOLOGY | Age: 31
End: 2021-08-18
Payer: COMMERCIAL

## 2021-08-18 VITALS
HEART RATE: 79 BPM | WEIGHT: 132.6 LBS | BODY MASS INDEX: 23.5 KG/M2 | OXYGEN SATURATION: 98 % | DIASTOLIC BLOOD PRESSURE: 74 MMHG | HEIGHT: 63 IN | SYSTOLIC BLOOD PRESSURE: 113 MMHG

## 2021-08-18 DIAGNOSIS — E13.9 LADA (LATENT AUTOIMMUNE DIABETES IN ADULTS), MANAGED AS TYPE 1 (HCC): Primary | ICD-10-CM

## 2021-08-18 LAB
CREATININE URINE: 243.9 MG/DL (ref 28–259)
HBA1C MFR BLD: 7.2 %
MICROALBUMIN UR-MCNC: <1.2 MG/DL
MICROALBUMIN/CREAT UR-RTO: NORMAL MG/G (ref 0–30)

## 2021-08-18 PROCEDURE — 83036 HEMOGLOBIN GLYCOSYLATED A1C: CPT | Performed by: INTERNAL MEDICINE

## 2021-08-18 PROCEDURE — 95251 CONT GLUC MNTR ANALYSIS I&R: CPT | Performed by: INTERNAL MEDICINE

## 2021-08-18 PROCEDURE — 99214 OFFICE O/P EST MOD 30 MIN: CPT | Performed by: INTERNAL MEDICINE

## 2021-08-18 PROCEDURE — 3051F HG A1C>EQUAL 7.0%<8.0%: CPT | Performed by: INTERNAL MEDICINE

## 2021-08-18 RX ORDER — BLOOD-GLUCOSE SENSOR
EACH MISCELLANEOUS
Qty: 3 EACH | Refills: 2 | Status: SHIPPED | OUTPATIENT
Start: 2021-08-18 | End: 2021-12-08

## 2021-08-18 RX ORDER — BLOOD-GLUCOSE,RECEIVER,CONT
EACH MISCELLANEOUS
Qty: 1 DEVICE | Refills: 0 | Status: SHIPPED | OUTPATIENT
Start: 2021-08-18 | End: 2021-12-08

## 2021-08-18 RX ORDER — FLASH GLUCOSE SENSOR
KIT MISCELLANEOUS
Qty: 2 EACH | Refills: 5 | Status: SHIPPED | OUTPATIENT
Start: 2021-08-18 | End: 2021-12-08 | Stop reason: SDUPTHER

## 2021-08-18 RX ORDER — BLOOD-GLUCOSE TRANSMITTER
EACH MISCELLANEOUS
Qty: 1 EACH | Refills: 0 | Status: SHIPPED | OUTPATIENT
Start: 2021-08-18 | End: 2021-12-08

## 2021-08-18 RX ORDER — INSULIN GLARGINE 100 [IU]/ML
INJECTION, SOLUTION SUBCUTANEOUS
Qty: 15 ML | Refills: 3 | Status: SHIPPED | OUTPATIENT
Start: 2021-08-18 | End: 2022-03-14 | Stop reason: SDUPTHER

## 2021-08-18 RX ORDER — INSULIN LISPRO 100 [IU]/ML
INJECTION, SOLUTION INTRAVENOUS; SUBCUTANEOUS
Qty: 15 ML | Refills: 3 | Status: SHIPPED | OUTPATIENT
Start: 2021-08-18 | End: 2021-08-23
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
## 2021-08-23 RX ORDER — INSULIN LISPRO 100 [IU]/ML
INJECTION, SOLUTION INTRAVENOUS; SUBCUTANEOUS
Qty: 33 ML | Refills: 3 | Status: SHIPPED | OUTPATIENT
Start: 2021-08-23 | End: 2022-03-14 | Stop reason: SDUPTHER

## 2021-08-23 NOTE — TELEPHONE ENCOUNTER
Medication:   Requested Prescriptions     Pending Prescriptions Disp Refills    insulin lispro, 1 Unit Dial, (HUMALOG KWIKPEN) 100 UNIT/ML SOPN [Pharmacy Med Name: HUMALOG 100 U/ML KWIK PEN INJ 3ML] 33 mL 3     Sig: ADMINISTER 11 UNITS UNDER THE SKIN THREE TIMES DAILY BEFORE A MEAL       Last Filled:      Patient Phone Number: [phone] (home) [phone] (work)    Last appt: 8/18/2021   Next appt: 12/8/2021    Last Labs DM:   Lab Results   Component Value Date    LABA1C 7.2 08/18/2021

## 2021-12-08 ENCOUNTER — OFFICE VISIT (OUTPATIENT)
Dept: ENDOCRINOLOGY | Age: 31
End: 2021-12-08
Payer: COMMERCIAL

## 2021-12-08 VITALS
DIASTOLIC BLOOD PRESSURE: 73 MMHG | HEART RATE: 74 BPM | BODY MASS INDEX: 23.39 KG/M2 | SYSTOLIC BLOOD PRESSURE: 104 MMHG | WEIGHT: 132 LBS | HEIGHT: 63 IN

## 2021-12-08 DIAGNOSIS — E13.9 LADA (LATENT AUTOIMMUNE DIABETES IN ADULTS), MANAGED AS TYPE 1 (HCC): ICD-10-CM

## 2021-12-08 LAB — HBA1C MFR BLD: 6.8 %

## 2021-12-08 PROCEDURE — 99213 OFFICE O/P EST LOW 20 MIN: CPT | Performed by: INTERNAL MEDICINE

## 2021-12-08 PROCEDURE — 83036 HEMOGLOBIN GLYCOSYLATED A1C: CPT | Performed by: INTERNAL MEDICINE

## 2021-12-08 PROCEDURE — 95251 CONT GLUC MNTR ANALYSIS I&R: CPT | Performed by: INTERNAL MEDICINE

## 2021-12-08 RX ORDER — FLASH GLUCOSE SCANNING READER
EACH MISCELLANEOUS
Qty: 1 EACH | Refills: 0 | Status: SHIPPED | OUTPATIENT
Start: 2021-12-08

## 2021-12-08 RX ORDER — FLASH GLUCOSE SENSOR
KIT MISCELLANEOUS
Qty: 2 EACH | Refills: 2 | Status: SHIPPED | OUTPATIENT
Start: 2021-12-08 | End: 2022-03-14 | Stop reason: SDUPTHER

## 2021-12-08 RX ORDER — FLASH GLUCOSE SENSOR
KIT MISCELLANEOUS
Qty: 2 EACH | Refills: 5 | Status: SHIPPED | OUTPATIENT
Start: 2021-12-08
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
## 2022-03-14 ENCOUNTER — OFFICE VISIT (OUTPATIENT)
Dept: ENDOCRINOLOGY | Age: 32
End: 2022-03-14
Payer: COMMERCIAL

## 2022-03-14 VITALS
BODY MASS INDEX: 22.82 KG/M2 | WEIGHT: 128.8 LBS | HEART RATE: 84 BPM | SYSTOLIC BLOOD PRESSURE: 95 MMHG | HEIGHT: 63 IN | DIASTOLIC BLOOD PRESSURE: 64 MMHG | OXYGEN SATURATION: 98 %

## 2022-03-14 DIAGNOSIS — E13.9 LADA (LATENT AUTOIMMUNE DIABETES IN ADULTS), MANAGED AS TYPE 1 (HCC): Primary | ICD-10-CM

## 2022-03-14 PROCEDURE — 95251 CONT GLUC MNTR ANALYSIS I&R: CPT | Performed by: INTERNAL MEDICINE

## 2022-03-14 PROCEDURE — 99214 OFFICE O/P EST MOD 30 MIN: CPT | Performed by: INTERNAL MEDICINE

## 2022-03-14 PROCEDURE — 83036 HEMOGLOBIN GLYCOSYLATED A1C: CPT | Performed by: INTERNAL MEDICINE

## 2022-03-14 RX ORDER — INSULIN LISPRO 100 [IU]/ML
INJECTION, SOLUTION INTRAVENOUS; SUBCUTANEOUS
Qty: 33 ML | Refills: 3 | Status: SHIPPED | OUTPATIENT
Start: 2022-03-14

## 2022-03-14 RX ORDER — INSULIN GLARGINE 100 [IU]/ML
INJECTION, SOLUTION SUBCUTANEOUS
Qty: 45 ML | Refills: 3 | Status: SHIPPED | OUTPATIENT
Start: 2022-03-14

## 2022-03-14 RX ORDER — FLASH GLUCOSE SENSOR
KIT MISCELLANEOUS
Qty: 2 EACH | Refills: 2 | Status: SHIPPED | OUTPATIENT
Start: 2022-03-14 | End: 2022-07-27
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
## 2022-03-15 RX ORDER — BLOOD SUGAR DIAGNOSTIC
STRIP MISCELLANEOUS
Qty: 400 STRIP | Refills: 0 | Status: SHIPPED | OUTPATIENT
Start: 2022-03-15

## 2022-03-15 NOTE — TELEPHONE ENCOUNTER
Requested Prescriptions     Pending Prescriptions Disp Refills    ACCU-CHEK ABIGAIL PLUS strip [Pharmacy Med Name: ACCU-CHEK ABIGAIL PLUS STRIPS 100S] 400 strip 0     Sig: TEST BEFORE MEALS AND AT BEDTIME         LOV 3/14/22  NOV 6/22/22    Last refill 1/14/21

## 2022-07-27 RX ORDER — FLASH GLUCOSE SENSOR
KIT MISCELLANEOUS
Qty: 2 EACH | Refills: 0 | Status: SHIPPED | OUTPATIENT
Start: 2022-07-27

## 2022-08-15 ENCOUNTER — OFFICE VISIT (OUTPATIENT)
Dept: ENDOCRINOLOGY | Age: 32
End: 2022-08-15
Payer: COMMERCIAL

## 2022-08-15 VITALS
HEART RATE: 77 BPM | OXYGEN SATURATION: 99 % | WEIGHT: 130 LBS | SYSTOLIC BLOOD PRESSURE: 112 MMHG | HEIGHT: 63 IN | BODY MASS INDEX: 23.04 KG/M2 | DIASTOLIC BLOOD PRESSURE: 70 MMHG | TEMPERATURE: 96.8 F

## 2022-08-15 DIAGNOSIS — E13.9 LADA (LATENT AUTOIMMUNE DIABETES IN ADULTS), MANAGED AS TYPE 1 (HCC): Primary | ICD-10-CM

## 2022-08-15 LAB — HBA1C MFR BLD: 7.9 %

## 2022-08-15 PROCEDURE — 99214 OFFICE O/P EST MOD 30 MIN: CPT | Performed by: INTERNAL MEDICINE

## 2022-08-15 PROCEDURE — 83036 HEMOGLOBIN GLYCOSYLATED A1C: CPT | Performed by: INTERNAL MEDICINE

## 2022-08-15 PROCEDURE — 95251 CONT GLUC MNTR ANALYSIS I&R: CPT | Performed by: INTERNAL MEDICINE
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
## 2022-11-21 ENCOUNTER — OFFICE VISIT (OUTPATIENT)
Dept: ENDOCRINOLOGY | Age: 32
End: 2022-11-21
Payer: COMMERCIAL

## 2022-11-21 VITALS
HEART RATE: 70 BPM | SYSTOLIC BLOOD PRESSURE: 115 MMHG | RESPIRATION RATE: 14 BRPM | HEIGHT: 63 IN | BODY MASS INDEX: 23.39 KG/M2 | TEMPERATURE: 98 F | WEIGHT: 132 LBS | DIASTOLIC BLOOD PRESSURE: 73 MMHG

## 2022-11-21 DIAGNOSIS — E13.9 LADA (LATENT AUTOIMMUNE DIABETES IN ADULTS), MANAGED AS TYPE 1 (HCC): ICD-10-CM

## 2022-11-21 DIAGNOSIS — E13.9 LADA (LATENT AUTOIMMUNE DIABETES IN ADULTS), MANAGED AS TYPE 1 (HCC): Primary | ICD-10-CM

## 2022-11-21 LAB
CREATININE URINE: 267.1 MG/DL (ref 28–259)
HBA1C MFR BLD: 8.1 %
HCT VFR BLD CALC: 40.9 % (ref 36–48)
HEMOGLOBIN: 13.6 G/DL (ref 12–16)
MCH RBC QN AUTO: 28.6 PG (ref 26–34)
MCHC RBC AUTO-ENTMCNC: 33.2 G/DL (ref 31–36)
MCV RBC AUTO: 86.1 FL (ref 80–100)
MICROALBUMIN UR-MCNC: 4.1 MG/DL
MICROALBUMIN/CREAT UR-RTO: 15.4 MG/G (ref 0–30)
PDW BLD-RTO: 12.5 % (ref 12.4–15.4)
PLATELET # BLD: 268 K/UL (ref 135–450)
PMV BLD AUTO: 9.9 FL (ref 5–10.5)
RBC # BLD: 4.75 M/UL (ref 4–5.2)
T4 FREE: 0.9 NG/DL (ref 0.9–1.8)
TSH REFLEX: 14.67 UIU/ML (ref 0.27–4.2)
WBC # BLD: 5.2 K/UL (ref 4–11)

## 2022-11-21 PROCEDURE — 99214 OFFICE O/P EST MOD 30 MIN: CPT | Performed by: INTERNAL MEDICINE

## 2022-11-21 PROCEDURE — 95251 CONT GLUC MNTR ANALYSIS I&R: CPT | Performed by: INTERNAL MEDICINE

## 2022-11-21 PROCEDURE — 3052F HG A1C>EQUAL 8.0%<EQUAL 9.0%: CPT | Performed by: INTERNAL MEDICINE

## 2022-11-21 PROCEDURE — 83036 HEMOGLOBIN GLYCOSYLATED A1C: CPT | Performed by: INTERNAL MEDICINE

## 2022-11-21 RX ORDER — FLASH GLUCOSE SENSOR
KIT MISCELLANEOUS
Qty: 2 EACH | Refills: 5 | Status: SHIPPED | OUTPATIENT
Start: 2022-11-21
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
## 2022-11-22 RX ORDER — LEVOTHYROXINE SODIUM 0.05 MG/1
50 TABLET ORAL DAILY
Qty: 90 TABLET | Refills: 1 | Status: SHIPPED | OUTPATIENT
Start: 2022-11-22

## 2023-02-27 ENCOUNTER — OFFICE VISIT (OUTPATIENT)
Dept: ENDOCRINOLOGY | Age: 33
End: 2023-02-27
Payer: COMMERCIAL

## 2023-02-27 VITALS
RESPIRATION RATE: 14 BRPM | BODY MASS INDEX: 22.29 KG/M2 | OXYGEN SATURATION: 98 % | TEMPERATURE: 98 F | DIASTOLIC BLOOD PRESSURE: 78 MMHG | HEART RATE: 87 BPM | WEIGHT: 125.8 LBS | SYSTOLIC BLOOD PRESSURE: 106 MMHG | HEIGHT: 63 IN

## 2023-02-27 DIAGNOSIS — E13.9 LADA (LATENT AUTOIMMUNE DIABETES IN ADULTS), MANAGED AS TYPE 1 (HCC): Primary | ICD-10-CM

## 2023-02-27 LAB — HBA1C MFR BLD: 8.9 %

## 2023-02-27 PROCEDURE — 95251 CONT GLUC MNTR ANALYSIS I&R: CPT | Performed by: INTERNAL MEDICINE

## 2023-02-27 PROCEDURE — 83036 HEMOGLOBIN GLYCOSYLATED A1C: CPT | Performed by: INTERNAL MEDICINE

## 2023-02-27 PROCEDURE — 99214 OFFICE O/P EST MOD 30 MIN: CPT | Performed by: INTERNAL MEDICINE
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
## 2023-03-15 RX ORDER — INSULIN GLARGINE 100 [IU]/ML
INJECTION, SOLUTION SUBCUTANEOUS
Qty: 45 ML | Refills: 3 | Status: SHIPPED | OUTPATIENT
Start: 2023-03-15

## 2023-03-15 NOTE — TELEPHONE ENCOUNTER
Medication:   Requested Prescriptions     Pending Prescriptions Disp Refills    insulin glargine (LANTUS SOLOSTAR) 100 UNIT/ML injection pen [Pharmacy Med Name: Amanda Kylerkenneth PEN INJ 3ML] 45 mL 3     Sig: TAKE 15 UNITS EVERY MORNING AND 6 UNITS AT BEDTIME       Last Filled:      Patient Phone Number: [phone] (home)     Last appt: 2/27/2023   Next appt: 6/21/2023    Last Labs DM:   Lab Results   Component Value Date/Time    LABA1C 8.9 02/27/2023 04:08 PM

## 2023-05-17 DIAGNOSIS — E13.9 LADA (LATENT AUTOIMMUNE DIABETES IN ADULTS), MANAGED AS TYPE 1 (HCC): ICD-10-CM

## 2023-05-17 NOTE — TELEPHONE ENCOUNTER
Medication:   Requested Prescriptions     Pending Prescriptions Disp Refills    Continuous Blood Gluc Sensor (FREESTYLE SHERRY 2 SENSOR) MISC [Pharmacy Med Name: Carolyne Lazar 2 SENSOR] 2 each 5     Sig: USE AS DIRECTED       Last Filled:      Patient Phone Number: [phone] (home)     Last appt: 2/27/2023   Next appt: 6/21/2023    Last Labs DM:   Lab Results   Component Value Date/Time    LABA1C 8.9 02/27/2023 04:08 PM

## 2023-06-06 RX ORDER — LEVOTHYROXINE SODIUM 0.05 MG/1
50 TABLET ORAL DAILY
Qty: 90 TABLET | Refills: 1 | Status: SHIPPED | OUTPATIENT
Start: 2023-06-06

## 2023-06-06 NOTE — TELEPHONE ENCOUNTER
Medication:   Requested Prescriptions     Pending Prescriptions Disp Refills    levothyroxine (SYNTHROID) 50 MCG tablet [Pharmacy Med Name: LEVOTHYROXINE 0.05MG (50MCG) TAB] 90 tablet 1     Sig: TAKE 1 TABLET BY MOUTH DAILY       Last Filled:      Patient Phone Number: [phone] (home)     Last appt: 2/27/2023   Next appt: 6/21/2023    Last Thyroid:   Lab Results   Component Value Date/Time    TSH 3.14 04/30/2014 09:58 AM    T4FREE 0.9 11/21/2022 11:37 AM

## 2023-06-21 ENCOUNTER — OFFICE VISIT (OUTPATIENT)
Dept: ENDOCRINOLOGY | Age: 33
End: 2023-06-21

## 2023-06-21 VITALS
SYSTOLIC BLOOD PRESSURE: 109 MMHG | TEMPERATURE: 98 F | HEART RATE: 90 BPM | HEIGHT: 63 IN | RESPIRATION RATE: 14 BRPM | BODY MASS INDEX: 25.37 KG/M2 | DIASTOLIC BLOOD PRESSURE: 80 MMHG | OXYGEN SATURATION: 97 % | WEIGHT: 143.2 LBS

## 2023-06-21 DIAGNOSIS — E13.9 LADA (LATENT AUTOIMMUNE DIABETES IN ADULTS), MANAGED AS TYPE 1 (HCC): ICD-10-CM

## 2023-06-21 LAB — HBA1C MFR BLD: 10.1 %

## 2023-06-21 RX ORDER — INSULIN LISPRO 100 [IU]/ML
INJECTION, SOLUTION INTRAVENOUS; SUBCUTANEOUS
Qty: 33 ML | Refills: 3 | Status: SHIPPED | OUTPATIENT
Start: 2023-06-21

## 2023-06-21 RX ORDER — LEVOTHYROXINE SODIUM 0.05 MG/1
50 TABLET ORAL DAILY
Qty: 90 TABLET | Refills: 1 | Status: SHIPPED | OUTPATIENT
Start: 2023-06-21

## 2023-06-21 RX ORDER — SEMAGLUTIDE 1.34 MG/ML
INJECTION, SOLUTION SUBCUTANEOUS
Qty: 1.5 ML | Refills: 3 | Status: SHIPPED | OUTPATIENT
Start: 2023-06-21

## 2023-06-21 RX ORDER — INSULIN GLARGINE 100 [IU]/ML
INJECTION, SOLUTION SUBCUTANEOUS
Qty: 45 ML | Refills: 3 | Status: SHIPPED | OUTPATIENT
Start: 2023-06-21

## 2023-06-21 RX ORDER — PEN NEEDLE, DIABETIC 30 GX3/16"
NEEDLE, DISPOSABLE MISCELLANEOUS
Qty: 450 EACH | Refills: 1 | Status: SHIPPED | OUTPATIENT
Start: 2023-06-21
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
## 2023-06-23 ENCOUNTER — TELEPHONE (OUTPATIENT)
Dept: ENDOCRINOLOGY | Age: 33
End: 2023-06-23

## 2023-06-27 ENCOUNTER — PATIENT MESSAGE (OUTPATIENT)
Dept: ENDOCRINOLOGY | Age: 33
End: 2023-06-27

## 2023-07-10 RX ORDER — DULAGLUTIDE 0.75 MG/.5ML
0.75 INJECTION, SOLUTION SUBCUTANEOUS WEEKLY
Qty: 4 ADJUSTABLE DOSE PRE-FILLED PEN SYRINGE | Refills: 2 | Status: SHIPPED | OUTPATIENT
Start: 2023-07-10

## 2023-07-13 ENCOUNTER — TELEPHONE (OUTPATIENT)
Dept: ENDOCRINOLOGY | Age: 33
End: 2023-07-13

## 2023-07-13 NOTE — TELEPHONE ENCOUNTER
Submitted PA for Trulicity  Via [facility] Key: [facility] LEVI STATUS: PENDING. Follow up done daily; if no response in three days we will refax for status check. If another three days goes by with no response we will call the insurance for status.

## 2023-07-14 NOTE — TELEPHONE ENCOUNTER
Rosalio Good Mullins: [facility] LEVI THORNTON Case ID: [id number] - Rx #: [id number]  Outcome  Denied on July 13  Request Reference Number: [id number].  TRULICITY INJ 3.23/4.0 is denied for not meeting the prior authorization requirement(s)    Denial scanned in media

## 2023-10-11 ENCOUNTER — OFFICE VISIT (OUTPATIENT)
Dept: ENDOCRINOLOGY | Age: 33
End: 2023-10-11

## 2023-10-11 VITALS
TEMPERATURE: 98 F | HEIGHT: 63 IN | HEART RATE: 83 BPM | BODY MASS INDEX: 27.64 KG/M2 | SYSTOLIC BLOOD PRESSURE: 115 MMHG | WEIGHT: 156 LBS | RESPIRATION RATE: 15 BRPM | OXYGEN SATURATION: 98 % | DIASTOLIC BLOOD PRESSURE: 77 MMHG

## 2023-10-11 DIAGNOSIS — E13.9 LADA (LATENT AUTOIMMUNE DIABETES IN ADULTS), MANAGED AS TYPE 1 (HCC): Primary | ICD-10-CM

## 2023-10-11 LAB — HBA1C MFR BLD: 10.3 %
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
## 2024-01-31 ENCOUNTER — OFFICE VISIT (OUTPATIENT)
Dept: ENDOCRINOLOGY | Age: 34
End: 2024-01-31
Payer: COMMERCIAL

## 2024-01-31 VITALS
HEIGHT: 63 IN | TEMPERATURE: 98 F | RESPIRATION RATE: 14 BRPM | BODY MASS INDEX: 28.07 KG/M2 | HEART RATE: 83 BPM | SYSTOLIC BLOOD PRESSURE: 112 MMHG | WEIGHT: 158.4 LBS | DIASTOLIC BLOOD PRESSURE: 70 MMHG | OXYGEN SATURATION: 100 %

## 2024-01-31 DIAGNOSIS — E03.9 ACQUIRED HYPOTHYROIDISM: ICD-10-CM

## 2024-01-31 DIAGNOSIS — E13.9 LADA (LATENT AUTOIMMUNE DIABETES IN ADULTS), MANAGED AS TYPE 1 (HCC): Primary | ICD-10-CM

## 2024-01-31 DIAGNOSIS — E13.9 LADA (LATENT AUTOIMMUNE DIABETES IN ADULTS), MANAGED AS TYPE 1 (HCC): ICD-10-CM

## 2024-01-31 LAB
ALBUMIN SERPL-MCNC: 4.5 G/DL (ref 3.4–5)
ALBUMIN/GLOB SERPL: 1.6 {RATIO} (ref 1.1–2.2)
ALP SERPL-CCNC: 64 U/L (ref 40–129)
ALT SERPL-CCNC: 9 U/L (ref 10–40)
ANION GAP SERPL CALCULATED.3IONS-SCNC: 11 MMOL/L (ref 3–16)
AST SERPL-CCNC: 11 U/L (ref 15–37)
BILIRUB SERPL-MCNC: <0.2 MG/DL (ref 0–1)
BUN SERPL-MCNC: 22 MG/DL (ref 7–20)
CALCIUM SERPL-MCNC: 9 MG/DL (ref 8.3–10.6)
CHLORIDE SERPL-SCNC: 104 MMOL/L (ref 99–110)
CO2 SERPL-SCNC: 24 MMOL/L (ref 21–32)
CREAT SERPL-MCNC: 0.7 MG/DL (ref 0.6–1.1)
CREAT UR-MCNC: 106.2 MG/DL (ref 28–259)
GFR SERPLBLD CREATININE-BSD FMLA CKD-EPI: >60 ML/MIN/{1.73_M2}
GLUCOSE SERPL-MCNC: 198 MG/DL (ref 70–99)
MICROALBUMIN UR DL<=1MG/L-MCNC: <1.2 MG/DL
MICROALBUMIN/CREAT UR: NORMAL MG/G (ref 0–30)
POTASSIUM SERPL-SCNC: 3.9 MMOL/L (ref 3.5–5.1)
PROT SERPL-MCNC: 7.3 G/DL (ref 6.4–8.2)
SODIUM SERPL-SCNC: 139 MMOL/L (ref 136–145)
TSH SERPL DL<=0.005 MIU/L-ACNC: 3.57 UIU/ML (ref 0.27–4.2)

## 2024-01-31 PROCEDURE — 99214 OFFICE O/P EST MOD 30 MIN: CPT | Performed by: INTERNAL MEDICINE

## 2024-01-31 PROCEDURE — 83036 HEMOGLOBIN GLYCOSYLATED A1C: CPT | Performed by: INTERNAL MEDICINE

## 2024-01-31 PROCEDURE — 95251 CONT GLUC MNTR ANALYSIS I&R: CPT | Performed by: INTERNAL MEDICINE
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
## 2024-03-19 RX ORDER — INSULIN GLARGINE 100 [IU]/ML
INJECTION, SOLUTION SUBCUTANEOUS
Qty: 45 ML | Refills: 3 | Status: SHIPPED | OUTPATIENT
Start: 2024-03-19

## 2024-03-19 NOTE — TELEPHONE ENCOUNTER
Medication:   Requested Prescriptions     Pending Prescriptions Disp Refills    insulin glargine (LANTUS SOLOSTAR) 100 UNIT/ML injection pen [Pharmacy Med Name: LANTUS SOLOSTAR PEN INJ 3ML] 45 mL 3     Sig: INJECT 2 UNITS UNDER THE SKIN IN THE MORNING AND 16 UNITS AT BEDTIME       Last Filled:      Patient Phone Number: [phone] (home)     Last appt: 1/31/2024   Next appt: 5/3/2024    Last Labs DM:   Lab Results   Component Value Date/Time    LABA1C 10.3 10/11/2023 02:33 PM

## 2024-05-03 ENCOUNTER — TELEMEDICINE (OUTPATIENT)
Dept: ENDOCRINOLOGY | Age: 34
End: 2024-05-03

## 2024-05-03 DIAGNOSIS — E13.9 LADA (LATENT AUTOIMMUNE DIABETES IN ADULTS), MANAGED AS TYPE 1 (HCC): Primary | ICD-10-CM

## 2024-05-03 RX ORDER — PROCHLORPERAZINE 25 MG/1
SUPPOSITORY RECTAL
Qty: 3 EACH | Refills: 3 | Status: SHIPPED | OUTPATIENT
Start: 2024-05-03

## 2024-05-03 RX ORDER — PROCHLORPERAZINE 25 MG/1
SUPPOSITORY RECTAL
Qty: 1 EACH | Refills: 0 | Status: SHIPPED | OUTPATIENT
Start: 2024-05-03

## 2024-05-03 NOTE — PROGRESS NOTES
found for: \"CHLPL\"  Lab Results   Component Value Date    TRIG 48 04/11/2015    TRIG 110 04/30/2014     Lab Results   Component Value Date    HDL 45 04/11/2015    HDL 43 04/30/2014     No components found for: \"LDLCALC\"    No components found for: \"LABVLDL\"    Lab Results   Component Value Date    LABA1C 10.3 10/11/2023       Assessment:     Jigna Tan is a 34 y.o. female with :    1.DM: Onset, presentation, insulin level, +FH and blood glucose trends suggestive of OMAR, treat as T1DM. Disease process and goals discussed. Refered for CHO counting, started on prandial insulin. Pump options discussed, patient will think about it.Advised given severe hypoglycemia, risk. On Freestyle alf, . Reviewed log,  flucutating glucose during the day.Some post lunch and dinner highs. May be due to inadequate counting of CHO or insulin after meals.  She will improve on CHO counting.  She takes snack at night   Advised to reduce CHO with snack to 15 gm  Advised to check glucose before driving or gets symptomatic.  Discussed pump options with automode, she is interested in omnipod.  Would like to check Dexcom price first  Discussed off label ozempic, not covered  Keep same I:C as had hypoglycemia with lower ratio    2.Weight gain:   discussed weight loss, recommend 1500-1800Kcal, lost weight    3.Hypothyroidism : On levothyroxine    Plan:       lantus 19/5    novolog 1;3    snack at bedtime ( usually is 20 gm)   Use 1:5   SSI 1 for 50>150  -4 <80   1 for 50 > 200 at bedtime   Consider omnipod   Take before or after meals as her meal intake is not regular given her job   Advise to check blood sugar 4 times a day   Patient to send blood sugar log for titration.   Advised to low simple carbohydrate and protein with each  meal diet. 45-60gm with meal  10-15gm with snack   Diabetes Care: recommend yearly eye exam, foot exam and urine microalbumin to   creatinine ratio.     -LDL at goal  -Daily ASA: not indicated  -Smoking status: non

## 2024-05-09 ENCOUNTER — PATIENT MESSAGE (OUTPATIENT)
Dept: ENDOCRINOLOGY | Age: 34
End: 2024-05-09

## 2024-05-09 NOTE — TELEPHONE ENCOUNTER
From: Jigna Tan  To: Dr. Oneal Alcantara  Sent: 5/9/2024 10:31 AM EDT  Subject: Dexcom    I was told by my pharmacy that my insurance wanted to speak to you about the dexcom approval. I was wanting to switch to the dexcom so I could look at also getting the omni-pod. The pharmaxy said they would send over the request. Thank you

## 2024-05-14 ENCOUNTER — TELEPHONE (OUTPATIENT)
Dept: ENDOCRINOLOGY | Age: 34
End: 2024-05-14

## 2024-05-14 NOTE — TELEPHONE ENCOUNTER
Submitted PA for Dexcom Sensor  Via [facility] Key: [id number]    STATUS: Approved today  PA Case: [id number], Status: Approved, Coverage Starts on: 5/14/2024 12:00:00 AM, Coverage Ends on: 5/14/2025

## 2024-06-13 NOTE — TELEPHONE ENCOUNTER
Bibi PA request for Dexcom, can we please print these and send them to [facility]

## 2024-06-24 RX ORDER — INSULIN LISPRO 100 [IU]/ML
INJECTION, SOLUTION INTRAVENOUS; SUBCUTANEOUS
Qty: 33 ML | Refills: 3 | Status: SHIPPED | OUTPATIENT
Start: 2024-06-24

## 2024-06-24 NOTE — TELEPHONE ENCOUNTER
Medication:   Requested Prescriptions     Pending Prescriptions Disp Refills    insulin lispro, 1 Unit Dial, (HUMALOG/ADMELOG) 100 UNIT/ML SOPN [Pharmacy Med Name: INSULIN LISPRO 100U/ML KWIKPEN 3ML] 33 mL 3     Sig: ADMINISTER 11 UNITS UNDER THE SKIN THREE TIMES DAILY BEFORE A MEAL       Last Filled:      Patient Phone Number: [phone] (home)     Last appt: 5/3/2024   Next appt: Visit date not found    Last Labs DM:   Lab Results   Component Value Date/Time    LABA1C 10.3 10/11/2023 02:33 PM

## 2024-08-24 DIAGNOSIS — E13.9 LADA (LATENT AUTOIMMUNE DIABETES IN ADULTS), MANAGED AS TYPE 1 (HCC): Primary | ICD-10-CM

## 2024-08-26 RX ORDER — PROCHLORPERAZINE 25 MG/1
SUPPOSITORY RECTAL
Qty: 1 EACH | Refills: 0 | Status: SHIPPED | OUTPATIENT
Start: 2024-08-26

## 2024-08-26 NOTE — TELEPHONE ENCOUNTER
Medication:   Requested Prescriptions     Signed Prescriptions Disp Refills    Continuous Glucose Transmitter (DEXCOM G6 TRANSMITTER) MISC 1 each 0     Sig: USE DAILY AS DIRECTED     Authorizing Provider: KENDRICK NEGRETE     Ordering User: NUPUR PACE       Last Filled:      Patient Phone Number: [phone] (home)     Last appt: 5/3/2024   Next appt: Visit date not found    Last Labs DM:   Lab Results   Component Value Date/Time    LABA1C 10.3 10/11/2023 02:33 PM

## 2024-09-12 ENCOUNTER — PATIENT MESSAGE (OUTPATIENT)
Dept: ENDOCRINOLOGY | Age: 34
End: 2024-09-12

## 2024-09-12 DIAGNOSIS — E13.9 LADA (LATENT AUTOIMMUNE DIABETES IN ADULTS), MANAGED AS TYPE 1 (HCC): ICD-10-CM

## 2024-10-01 ENCOUNTER — OFFICE VISIT (OUTPATIENT)
Dept: ENDOCRINOLOGY | Age: 34
End: 2024-10-01
Payer: COMMERCIAL

## 2024-10-01 VITALS
OXYGEN SATURATION: 98 % | SYSTOLIC BLOOD PRESSURE: 136 MMHG | WEIGHT: 173 LBS | HEART RATE: 80 BPM | DIASTOLIC BLOOD PRESSURE: 84 MMHG | BODY MASS INDEX: 30.65 KG/M2

## 2024-10-01 DIAGNOSIS — E03.9 ACQUIRED HYPOTHYROIDISM: Primary | ICD-10-CM

## 2024-10-01 DIAGNOSIS — E13.9 LADA (LATENT AUTOIMMUNE DIABETES IN ADULTS), MANAGED AS TYPE 1 (HCC): ICD-10-CM

## 2024-10-01 LAB — HBA1C MFR BLD: 8.1 %

## 2024-10-01 PROCEDURE — 95251 CONT GLUC MNTR ANALYSIS I&R: CPT | Performed by: INTERNAL MEDICINE

## 2024-10-01 PROCEDURE — 3052F HG A1C>EQUAL 8.0%<EQUAL 9.0%: CPT | Performed by: INTERNAL MEDICINE

## 2024-10-01 PROCEDURE — 99214 OFFICE O/P EST MOD 30 MIN: CPT | Performed by: INTERNAL MEDICINE

## 2024-10-01 PROCEDURE — 83036 HEMOGLOBIN GLYCOSYLATED A1C: CPT | Performed by: INTERNAL MEDICINE

## 2024-10-01 RX ORDER — INSULIN GLARGINE 100 [IU]/ML
INJECTION, SOLUTION SUBCUTANEOUS
Qty: 45 ML | Refills: 3 | Status: SHIPPED | OUTPATIENT
Start: 2024-10-01

## 2024-10-01 RX ORDER — SEMAGLUTIDE 1.34 MG/ML
INJECTION, SOLUTION SUBCUTANEOUS
Qty: 1 ADJUSTABLE DOSE PRE-FILLED PEN SYRINGE | Refills: 4 | Status: SHIPPED | OUTPATIENT
Start: 2024-10-01
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
## 2024-10-02 ENCOUNTER — TELEPHONE (OUTPATIENT)
Dept: ENDOCRINOLOGY | Age: 34
End: 2024-10-02

## 2024-10-02 RX ORDER — SEMAGLUTIDE 1.34 MG/ML
INJECTION, SOLUTION SUBCUTANEOUS
Qty: 2 ADJUSTABLE DOSE PRE-FILLED PEN SYRINGE | Refills: 0 | COMMUNITY
Start: 2024-10-02

## 2024-10-02 NOTE — TELEPHONE ENCOUNTER
Submitted PA for Ozempic  Via [facility] Key: [id number] STATUS: PENDING.    Follow up done daily; if no decision with in three days we will refax.  If another three days goes by with no decision will call the insurance for status.

## 2024-10-08 NOTE — TELEPHONE ENCOUNTER
The medication was DENIED; DENIAL letter is uploaded to MED[employer].    General Denial Reasoning:    ___  Patient must try  medication before the insurance will cover this drug.  Unless there is a contraindication that you can provide.    ___  Drug is not covered for off label usage.  This drug is FDA approved for .    ___  Drug is not covered by the plan ( Plan Exclusion)    ___  Other; please see Denial Letter.    DIABETIC MEDICATION DENIALS:    ___ Must have a A1C greater the 7.0.    ___ Hypoglycemic episodes or 3 or more injections of insulin daily for Continuous Monitors.    ___   Drug is not covered by the plan ( Plan Exclusion)    __x_  Other; please see Denial Letter.    WEIGHT LOSS MEDICATIONS DENIALS:    ___  Must have Tried and Failed Diet and Exercise.    ___  Insurance requesting Weight Loss Diary.    ___   Drug is not covered by the plan ( Plan Exclusion)    ___  Other; please see Denial Letter.    Note :        If you want an APPEAL; please note in this encounter what new information you would like to APPEAL with.  Once complete route back to PA POOL.    If this requires a response please respond to the pool ( P MHCX P[facility] MEDICATION PRE-AUTH).      Thank you please advise patient.

## 2024-10-13 ENCOUNTER — PATIENT MESSAGE (OUTPATIENT)
Dept: ENDOCRINOLOGY | Age: 34
End: 2024-10-13

## 2025-01-14 ENCOUNTER — OFFICE VISIT (OUTPATIENT)
Dept: ENDOCRINOLOGY | Age: 35
End: 2025-01-14
Payer: COMMERCIAL

## 2025-01-14 VITALS
SYSTOLIC BLOOD PRESSURE: 120 MMHG | BODY MASS INDEX: 30.65 KG/M2 | HEART RATE: 78 BPM | WEIGHT: 173 LBS | DIASTOLIC BLOOD PRESSURE: 82 MMHG | OXYGEN SATURATION: 99 %

## 2025-01-14 DIAGNOSIS — E13.9 LADA (LATENT AUTOIMMUNE DIABETES IN ADULTS), MANAGED AS TYPE 1 (HCC): ICD-10-CM

## 2025-01-14 DIAGNOSIS — E03.9 ACQUIRED HYPOTHYROIDISM: ICD-10-CM

## 2025-01-14 LAB — HBA1C MFR BLD: 7.3 %

## 2025-01-14 PROCEDURE — 83036 HEMOGLOBIN GLYCOSYLATED A1C: CPT | Performed by: INTERNAL MEDICINE

## 2025-01-14 PROCEDURE — 99214 OFFICE O/P EST MOD 30 MIN: CPT | Performed by: INTERNAL MEDICINE

## 2025-01-14 PROCEDURE — 95251 CONT GLUC MNTR ANALYSIS I&R: CPT | Performed by: INTERNAL MEDICINE

## 2025-01-14 RX ORDER — BLOOD-GLUCOSE SENSOR
1 EACH MISCELLANEOUS
Qty: 2 EACH | Refills: 5 | Status: SHIPPED | OUTPATIENT
Start: 2025-01-14

## 2025-01-14 RX ORDER — SEMAGLUTIDE 1.34 MG/ML
INJECTION, SOLUTION SUBCUTANEOUS
Qty: 1 ADJUSTABLE DOSE PRE-FILLED PEN SYRINGE | Refills: 0 | COMMUNITY
Start: 2025-01-14
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
## 2025-04-14 ENCOUNTER — OFFICE VISIT (OUTPATIENT)
Dept: ENDOCRINOLOGY | Age: 35
End: 2025-04-14
Payer: COMMERCIAL

## 2025-04-14 VITALS
HEART RATE: 82 BPM | SYSTOLIC BLOOD PRESSURE: 109 MMHG | WEIGHT: 158.8 LBS | HEIGHT: 63 IN | DIASTOLIC BLOOD PRESSURE: 78 MMHG | BODY MASS INDEX: 28.14 KG/M2

## 2025-04-14 DIAGNOSIS — E13.9 LADA (LATENT AUTOIMMUNE DIABETES IN ADULTS), MANAGED AS TYPE 1 (HCC): ICD-10-CM

## 2025-04-14 DIAGNOSIS — E13.9 LADA (LATENT AUTOIMMUNE DIABETES IN ADULTS), MANAGED AS TYPE 1 (HCC): Primary | ICD-10-CM

## 2025-04-14 LAB
ALBUMIN SERPL-MCNC: 4.3 G/DL (ref 3.4–5)
ALBUMIN/GLOB SERPL: 1.3 {RATIO} (ref 1.1–2.2)
ALP SERPL-CCNC: 64 U/L (ref 40–129)
ALT SERPL-CCNC: 12 U/L (ref 10–40)
ANION GAP SERPL CALCULATED.3IONS-SCNC: 11 MMOL/L (ref 3–16)
AST SERPL-CCNC: 20 U/L (ref 15–37)
BILIRUB SERPL-MCNC: 0.4 MG/DL (ref 0–1)
BUN SERPL-MCNC: 19 MG/DL (ref 7–20)
CALCIUM SERPL-MCNC: 9.3 MG/DL (ref 8.3–10.6)
CHLORIDE SERPL-SCNC: 102 MMOL/L (ref 99–110)
CO2 SERPL-SCNC: 23 MMOL/L (ref 21–32)
CREAT SERPL-MCNC: 0.7 MG/DL (ref 0.6–1.1)
CREAT UR-MCNC: 156 MG/DL (ref 28–259)
DEPRECATED RDW RBC AUTO: 12.5 % (ref 12.4–15.4)
GFR SERPLBLD CREATININE-BSD FMLA CKD-EPI: >90 ML/MIN/{1.73_M2}
GLUCOSE SERPL-MCNC: 197 MG/DL (ref 70–99)
HBA1C MFR BLD: 7.2 %
HCT VFR BLD AUTO: 38.4 % (ref 36–48)
HGB BLD-MCNC: 13.2 G/DL (ref 12–16)
MCH RBC QN AUTO: 29.4 PG (ref 26–34)
MCHC RBC AUTO-ENTMCNC: 34.3 G/DL (ref 31–36)
MCV RBC AUTO: 85.6 FL (ref 80–100)
MICROALBUMIN UR DL<=1MG/L-MCNC: <1.2 MG/DL
MICROALBUMIN/CREAT UR: NORMAL MG/G (ref 0–30)
PLATELET # BLD AUTO: 263 K/UL (ref 135–450)
PMV BLD AUTO: 8.8 FL (ref 5–10.5)
POTASSIUM SERPL-SCNC: 4.6 MMOL/L (ref 3.5–5.1)
PROT SERPL-MCNC: 7.5 G/DL (ref 6.4–8.2)
RBC # BLD AUTO: 4.49 M/UL (ref 4–5.2)
SODIUM SERPL-SCNC: 136 MMOL/L (ref 136–145)
TSH SERPL DL<=0.005 MIU/L-ACNC: 3.92 UIU/ML (ref 0.27–4.2)
WBC # BLD AUTO: 9.8 K/UL (ref 4–11)

## 2025-04-14 PROCEDURE — 3051F HG A1C>EQUAL 7.0%<8.0%: CPT | Performed by: INTERNAL MEDICINE

## 2025-04-14 PROCEDURE — 95251 CONT GLUC MNTR ANALYSIS I&R: CPT | Performed by: INTERNAL MEDICINE

## 2025-04-14 PROCEDURE — 99214 OFFICE O/P EST MOD 30 MIN: CPT | Performed by: INTERNAL MEDICINE

## 2025-04-14 PROCEDURE — 83036 HEMOGLOBIN GLYCOSYLATED A1C: CPT | Performed by: INTERNAL MEDICINE

## 2025-04-14 RX ORDER — SEMAGLUTIDE 1.34 MG/ML
INJECTION, SOLUTION SUBCUTANEOUS
Qty: 1 ADJUSTABLE DOSE PRE-FILLED PEN SYRINGE | Refills: 4 | Status: SHIPPED | OUTPATIENT
Start: 2025-04-14
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
## 2025-04-16 DIAGNOSIS — E13.9 LADA (LATENT AUTOIMMUNE DIABETES IN ADULTS), MANAGED AS TYPE 1 (HCC): Primary | ICD-10-CM

## 2025-04-16 RX ORDER — INSULIN GLARGINE 100 [IU]/ML
INJECTION, SOLUTION SUBCUTANEOUS
Qty: 45 ML | Refills: 3 | Status: SHIPPED | OUTPATIENT
Start: 2025-04-16

## 2025-04-16 NOTE — TELEPHONE ENCOUNTER
Medication:   Requested Prescriptions     Pending Prescriptions Disp Refills    insulin glargine (LANTUS SOLOSTAR) 100 UNIT/ML injection pen [Pharmacy Med Name: LANTUS SOLOSTAR PEN INJ 3ML] 45 mL 3     Sig: INJECT 19 UNITS UNDER THE SKIN IN MORNING AND 5 UNITS AT BEDTIME       Last Filled:      Patient Phone Number: [phone] (home)     Last appt: 4/14/2025   Next appt: 7/14/2025    Last Labs DM:   Lab Results   Component Value Date/Time    LABA1C 7.2 04/14/2025 12:16 PM

## 2025-04-22 ENCOUNTER — TELEPHONE (OUTPATIENT)
Dept: ENDOCRINOLOGY | Age: 35
End: 2025-04-22

## 2025-04-22 NOTE — TELEPHONE ENCOUNTER
Submitted PA for Ozempic  Via [facility] Key: [id number] STATUS: PENDING.    Follow up done daily; if no decision with in three days we will refax.  If another three days goes by with no decision will call the insurance for status.

## 2025-04-28 ENCOUNTER — TELEPHONE (OUTPATIENT)
Dept: ENDOCRINOLOGY | Age: 35
End: 2025-04-28

## 2025-07-04 DIAGNOSIS — E13.9 LADA (LATENT AUTOIMMUNE DIABETES IN ADULTS), MANAGED AS TYPE 1 (HCC): Primary | ICD-10-CM

## 2025-07-07 RX ORDER — INSULIN LISPRO 100 [IU]/ML
INJECTION, SOLUTION INTRAVENOUS; SUBCUTANEOUS
Qty: 33 ML | Refills: 0 | Status: SHIPPED | OUTPATIENT
Start: 2025-07-07

## 2025-07-14 ENCOUNTER — OFFICE VISIT (OUTPATIENT)
Dept: ENDOCRINOLOGY | Age: 35
End: 2025-07-14
Payer: COMMERCIAL

## 2025-07-14 VITALS
BODY MASS INDEX: 27.81 KG/M2 | HEART RATE: 68 BPM | OXYGEN SATURATION: 100 % | WEIGHT: 157 LBS | SYSTOLIC BLOOD PRESSURE: 127 MMHG | DIASTOLIC BLOOD PRESSURE: 78 MMHG

## 2025-07-14 DIAGNOSIS — E13.9 LADA (LATENT AUTOIMMUNE DIABETES IN ADULTS), MANAGED AS TYPE 1 (HCC): ICD-10-CM

## 2025-07-14 LAB — HBA1C MFR BLD: 7.2 %

## 2025-07-14 PROCEDURE — 83036 HEMOGLOBIN GLYCOSYLATED A1C: CPT | Performed by: INTERNAL MEDICINE

## 2025-07-14 PROCEDURE — 3051F HG A1C>EQUAL 7.0%<8.0%: CPT | Performed by: INTERNAL MEDICINE

## 2025-07-14 PROCEDURE — 99214 OFFICE O/P EST MOD 30 MIN: CPT | Performed by: INTERNAL MEDICINE

## 2025-07-14 PROCEDURE — 95251 CONT GLUC MNTR ANALYSIS I&R: CPT | Performed by: INTERNAL MEDICINE

## 2025-07-14 RX ORDER — BLOOD-GLUCOSE SENSOR
1 EACH MISCELLANEOUS
Qty: 2 EACH | Refills: 5 | Status: SHIPPED | OUTPATIENT
Start: 2025-07-14

## 2025-07-14 RX ORDER — INSULIN GLARGINE 100 [IU]/ML
INJECTION, SOLUTION SUBCUTANEOUS
Qty: 45 ML | Refills: 3 | Status: SHIPPED | OUTPATIENT
Start: 2025-07-14

## 2025-07-14 RX ORDER — LEVOTHYROXINE SODIUM 50 UG/1
50 TABLET ORAL DAILY
Qty: 90 TABLET | Refills: 1 | Status: SHIPPED | OUTPATIENT
Start: 2025-07-14

## 2025-07-14 RX ORDER — INSULIN LISPRO 100 [IU]/ML
INJECTION, SOLUTION INTRAVENOUS; SUBCUTANEOUS
Qty: 33 ML | Refills: 0 | Status: SHIPPED | OUTPATIENT
Start: 2025-07-14

## 2025-07-14 NOTE — PROGRESS NOTES
Advised to see ophthalmologist  due  Last foot exam: 1/24  Last microalbumin to creatinine ratio:1/24    10/15  C-peptide 0.2 Glucose 95  SEDA 52 ICA -ve    She had gained 7 lb .Had advised low calorie.  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    WISDOM TOOTH EXTRACTION         Review of Systems    Scanned, reviewed    Vitals:    07/14/25 1218   BP: 127/78   Pulse: 68   SpO2: 100%           Constitutional: Well-developed, appears stated age, cooperative, in no acute distress  H/E/N/M/T:atraumatic, normocephalic, external ears, nose, lips normal without lesions  Eyes: Lids, lashes, conjunctivae and sclerae normal, No proptosis, no redness  Neck: supple, symmetrical, no swelling  Skin: No obvious rashes or lesions present.  Skin and hair texture normal  Psychiatric: Judgement and Insight:  judgement and insight appear normal  Neuro: Normal without focal findings, speech is normal normal, speech is spontaneous  Chest: No labored breathing, no chest deformity, no stridor  Musculoskeletal: No joint deformity, swelling          1/24    Skeletal foot exam is normal, no skin lesions, toenails are normal,  10 g monofilament is 10/10 on the right and 10/10 on the left     Lab Reviewed   No components found for: \"CHLPL\"  Lab Results   Component Value Date    TRIG 48 04/11/2015    TRIG 110 04/30/2014     Lab Results   Component Value Date    HDL 45 04/11/2015    HDL 43 04/30/2014     No components found for: \"LDLCALC\"    No